# Patient Record
(demographics unavailable — no encounter records)

---

## 2024-10-10 NOTE — HISTORY OF PRESENT ILLNESS
[de-identified] : This is very nice 72-year-old female here for interim evaluation of bilateral total knee arthroplasty. The patient reports good pain relief since surgery in the replaced joint and satisfactory restoration of function in terms of activities of daily living. The patient no longer requires an assistive device for ambulation, does not require pain medication, and completed postoperative physical therapy. They report unlimited activities of daily living and unlimited walking tolerance. The patient is thrilled with their progress from surgery and ultimate outcome.

## 2024-10-10 NOTE — DISCUSSION/SUMMARY
[de-identified] :  The patient is doing well after bilateral total knee arthroplasty. Continue to be weight bearing as tolerated without restriction. Daily home exercise program recommended. Follow up is recommended in 1 year.

## 2024-10-10 NOTE — PHYSICAL EXAM
[de-identified] : Patient is well nourished, well-developed, in no acute distress, with appropriate mood and affect. The patient is oriented to time, place, and person. Respirations are even and unlabored. Gait evaluation does not reveal a limp. There is no inguinal adenopathy.  The bilateral limbs are well-perfused, has a well appearing surgical scar, and shows a grossly normal motor and sensory examination. Knee motion is painless and the bilateral knee moves from 0 to 125 degrees. The knee is stable within that range-of-motion to AP and ML stress. The alignment of the knee is neutral. Muscle strength is normal. Quadriceps and hamstring muscle strength is normal bilaterally. Pedal pulses are palpable.  [de-identified] : AP, lateral, tunnel, and sunrise knee x-rays of the bilateral knee were ordered and obtained in the office and demonstrate satisfactory position and alignment of the components are present. No signs of loosening are seen.

## 2024-10-29 NOTE — HISTORY OF PRESENT ILLNESS
[FreeTextEntry1] : 70 y/o G0 with LMP: 1989 presents for annual check as LEBRON daughter reported today.  CC: Right breast biopsy and radiation site for tubular cancer has started to open. No drainage.  OB: never pregnant  GYN: 1989 Total hysterectomy w/ Right oophorectomy for broad lesion fibroid.  Menstrual Triad: 11 x 30 x 5 Used Premarin for hot sweats in 40s post hyst. Used Premarin with T, then estrace patch.  No PMB nor changes to urination nor BM.   Had sebaceous cysts on perineum, but feeling better with AVAR wash.  Med: DM HLD hypo thyroid HTN Anxiety/depression - started during breast cancer tx.  LBBB constipation  Sx:  1989 hysterectomy - RSO 2000 Right breast lumpectomy- Stage 1 tubular breast cancer. In [place], had lumpectomy and LN dissection. Needed RT and Tamoxifen and Femara. 2010 Left breast reduction & abdominoplasty. 2011 salpingectomy, Left oophorectomy showed cystadenofibroma.  Rou-en-Y in past  SH: single, retired NP, lives alone, drinks alcohol occasionally     [Mammogramdate] : 3/22/24 [BoneDensityDate] : 3/22/24

## 2024-10-29 NOTE — DISCUSSION/SUMMARY
[FreeTextEntry1] : This year patient described herself as LEBRON daughter. Area is smooth without lesion. I have asked her to see Dr Paulo Dawn for complete evaluation.   Right breast with 3 x 2 cm calcified region read as " large area of calcified fat necrosis"  This area feels like there might be staples or other inorganic material in skin. 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 I have asked her to see breast surgeons for consultation.  Cont mammo/US   Vaginal dryness: estrogen used in past Pt asking for renewal. Use only 1/2 gm BIW now but stop prior to colposcopy. Maintenance of "pea-sized" amount to introitus BIW.  RTO 1 yr given LEBRON exposure.

## 2024-10-29 NOTE — PHYSICAL EXAM
[Appropriately responsive] : appropriately responsive [Alert] : alert [No Acute Distress] : no acute distress [No Lymphadenopathy] : no lymphadenopathy [Soft] : soft [Non-tender] : non-tender [Non-distended] : non-distended [No HSM] : No HSM [No Lesions] : no lesions [No Mass] : no mass [Oriented x3] : oriented x3 [FreeTextEntry6] : Right breast 4 cm scar in upper quadrant [Vulvar Atrophy] : vulvar atrophy [Labia Majora] : normal [Labia Minora] : normal [Normal] : normal [Absent] : absent [FreeTextEntry4] : Pink redness to apex c/w vaginal dryness. [FreeTextEntry5] : Recto-vaginal exam smooth, no lesions. [FreeTextEntry9] : Torres neg

## 2025-01-10 NOTE — DATA REVIEWED
[FreeTextEntry1] : I have independently reviewed the reports and the images.   B/l mammogram and US 3/22/24 - scattered fibroglandular density  - large calcified fat necrosis in R breast - BIRADS 2

## 2025-01-10 NOTE — PAST MEDICAL HISTORY
[Postmenopausal] : The patient is postmenopausal [Menarche Age ____] : age at menarche was [unfilled] [Menopause Age____] : age at menopause was [unfilled] [History of Hormone Replacement Treatment] : has a history of hormone replacement treatment [Total Preg ___] : G[unfilled] [de-identified] : systemic estradiol x 7 years, last taken around 1999

## 2025-01-10 NOTE — ASSESSMENT
[FreeTextEntry1] : Ms. Mayorga is a 73 year old woman 25 years s/p R lumpectomy, axillary node dissection. Her breast exam today is without suspicious findings. The imaging is reviewed with her. Based on the exam and imaging, the right breast lump is consistent with benign calcified fat necrosis, and with her general weight loss, has eroded through the skin of the breast and is painful. A referral to the Plastic Surgeon is provided regarding excision of this area given that it is tender and has eroded through the skin; the Plastic Surgeon can also be consulted regarding the significant breast asymmetry. She is to call us back after meeting with the Plastic Surgeon.   Given her early age of breast cancer diagnosis, we discussed genetic testing including the risks, benefits, and implications of the results from negative, to a variant of uncertain significance, to positive. A negative result does not exclude the possibility of a gene mutation that is at work but has not yet been discovered, and the interpretation of genetic testing results is to be done in conjunction with consideration of the family history. With a variant of uncertain significance, there is insufficient evidence to determine if the finding is benign or pathogenic; management is not changed based on variants of uncertain significance, and should new  to a change in the classification, the patient will be updated. With a positive gene mutation, management can change. Management options for carrying a mutation with an increased risk of breast cancer range from heightened surveillance with breast MRI to prophylactic mastectomies. Depending on the mutation, there may be an elevated risk for other types of cancer which would influence management accordingly. Emotional, family, insurance, and cost concerns are reviewed. Written information is provided. Ms. Mayorga is interested, and genetic testing is drawn.  A mammogram is ordered for March for annual screening. She understands and is comfortable with the plan. She is encouraged to call if any questions or concerns arise.

## 2025-01-10 NOTE — PAST MEDICAL HISTORY
[Postmenopausal] : The patient is postmenopausal [Menarche Age ____] : age at menarche was [unfilled] [Menopause Age____] : age at menopause was [unfilled] [History of Hormone Replacement Treatment] : has a history of hormone replacement treatment [Total Preg ___] : G[unfilled] [de-identified] : systemic estradiol x 7 years, last taken around 1999

## 2025-01-10 NOTE — PHYSICAL EXAM
[Normocephalic] : normocephalic [Atraumatic] : atraumatic [Sclera nonicteric] : sclera nonicteric [Supple] : supple [No Supraclavicular Adenopathy] : no supraclavicular adenopathy [No Cervical Adenopathy] : no cervical adenopathy [Clear to Auscultation Bilat] : clear to auscultation bilaterally [Normal Sinus Rhythm] : normal sinus rhythm [Examined in the supine and seated position] : examined in the supine and seated position [Asymmetrical] : asymmetrical [No dominant masses] : no dominant masses left breast [No Nipple Retraction] : no left nipple retraction [No Nipple Discharge] : no left nipple discharge [No Axillary Lymphadenopathy] : no left axillary lymphadenopathy [No Edema] : no edema [No Rashes] : no rashes [No Ulceration] : no ulceration [de-identified] : R << L [de-identified] : 5 cm hard mass by UOQ lumpectomy scar that is adherent to the skin, with a calcified point that has ulcerated through the skin

## 2025-01-10 NOTE — HISTORY OF PRESENT ILLNESS
[FreeTextEntry1] : Ms. Mayorga is a 73 year old woman who presents for a consultation for a right breast mass. Her breast history is significant for  1.) Right breast infiltrating ductal carcinoma diagnosed in 1999 at age 47, pathologic stage I, pT1 pN0, SBR 3 to 4/9, ER 80%, NM 90%, Her2 0 - s/p R lumpectomy, axillary node dissection (1/24/00, Dr. Day) and R re-excision lumpectomy (2/9/00) = no residual invasive tumor, DCIS, 0/15 LN - s/p radiation - s/p 7 yrs of tamoxifen then letrozole  Due to a chronic seroma and radiation, the patient had a chronic lump of calcified fat necrosis in the right upper breast by the lumpectomy scar. Over the past year, she has lost 40 pounds, and 6 months ago, a calcified point jutting out from the fat necrosis has eroded through the skin. The area is tender to pressure. She plans to be losing some more weight. No other lumps, nipple discharge or skin changes. No arm swelling.   Her family history is not significant for any breast cancer.

## 2025-01-10 NOTE — PHYSICAL EXAM
[Normocephalic] : normocephalic [Atraumatic] : atraumatic [Sclera nonicteric] : sclera nonicteric [Supple] : supple [No Supraclavicular Adenopathy] : no supraclavicular adenopathy [No Cervical Adenopathy] : no cervical adenopathy [Clear to Auscultation Bilat] : clear to auscultation bilaterally [Normal Sinus Rhythm] : normal sinus rhythm [Examined in the supine and seated position] : examined in the supine and seated position [Asymmetrical] : asymmetrical [No dominant masses] : no dominant masses left breast [No Nipple Retraction] : no left nipple retraction [No Nipple Discharge] : no left nipple discharge [No Axillary Lymphadenopathy] : no left axillary lymphadenopathy [No Edema] : no edema [No Rashes] : no rashes [No Ulceration] : no ulceration [de-identified] : R << L [de-identified] : 5 cm hard mass by UOQ lumpectomy scar that is adherent to the skin, with a calcified point that has ulcerated through the skin

## 2025-01-10 NOTE — CONSULT LETTER
[Dear  ___] : Dear  [unfilled], [Consult Letter:] : I had the pleasure of evaluating your patient, [unfilled]. [Please see my note below.] : Please see my note below. [Consult Closing:] : Thank you very much for allowing me to participate in the care of this patient.  If you have any questions, please do not hesitate to contact me. [Sincerely,] : Sincerely, [DrJhonathan  ___] : Dr. REED [FreeTextEntry3] : Marlena Atkinson MD FACS

## 2025-01-10 NOTE — HISTORY OF PRESENT ILLNESS
[FreeTextEntry1] : Ms. Mayorga is a 73 year old woman who presents for a consultation for a right breast mass. Her breast history is significant for  1.) Right breast infiltrating ductal carcinoma diagnosed in 1999 at age 47, pathologic stage I, pT1 pN0, SBR 3 to 4/9, ER 80%, ME 90%, Her2 0 - s/p R lumpectomy, axillary node dissection (1/24/00, Dr. Day) and R re-excision lumpectomy (2/9/00) = no residual invasive tumor, DCIS, 0/15 LN - s/p radiation - s/p 7 yrs of tamoxifen then letrozole  Due to a chronic seroma and radiation, the patient had a chronic lump of calcified fat necrosis in the right upper breast by the lumpectomy scar. Over the past year, she has lost 40 pounds, and 6 months ago, a calcified point jutting out from the fat necrosis has eroded through the skin. The area is tender to pressure. She plans to be losing some more weight. No other lumps, nipple discharge or skin changes. No arm swelling.   Her family history is not significant for any breast cancer.

## 2025-01-16 NOTE — PHYSICAL EXAM
[NI] : Normal [de-identified] : Please see scanned in breast sheet SN-N: L- 27 R- 24 N-IMF: L- 10, R- 8 BW: L/R: 14.5/15

## 2025-01-16 NOTE — ASSESSMENT
[FreeTextEntry1] : The patient was counseled about reconstructive options following mastectomy, including autologous, prosthetic, and the options of foregoing reconstruction.  Additionally, she was explained the options regarding the timing of reconstruction, including immediate reconstruction at the time of mastectomy or delayed reconstruction at a later date.   The patient was extensively counseled on the operation and the perioperative recoveries of both autologous and prosthetic reconstructions.  The patient understands the risks with abdominally based microsurgical reconstruction including partial or total flap loss, revisit to the operating room in the perioperative period, wound dehiscence, mastectomy skin flap necrosis, fat necrosis, abdominal wall morbidity (laxity, bulge, hernia), asymmetry, paresthesia, seroma, hematoma, and infection.  Placement of abdominal mesh is also planned and this was discussed with the patient. Risks of abdominal mesh placement include infection, extrusion, need for removal, mesh seroma. She also understands the risks with implant-based reconstruction including infection, implant malposition, extrusion, deflation, capsular contracture, mastectomy skin flap necrosis, wound dehiscence, asymmetry, seroma, paresthesia, and hematoma.   After careful consideration, the patient decided to proceed with right mastectomy with oncoplastic flat closure. The patient is in agreement with this plan and wishes to proceed.  The patient understands all of these risks and she provides informed consent.

## 2025-01-16 NOTE — HISTORY OF PRESENT ILLNESS
[FreeTextEntry1] : Ms. CHANTELLE FUENTES  is a 73 year  old female with past medical history of asthma, LBBB, hypothyroidism, aortic stenosis, T2DM, high cholesterol, and right breast infiltrating ductal carcinoma diagnosed in 1999 at age 47, who presents after being referred to the office by Dr Atkinson to discuss her revision options. Patient had a right lumpectomy, with axillary node dissection (1/24/00, Dr. Day) and right re-excision lumpectomy (2/9/00). In 2010, she had a revision surgery where they lifted and reduced her left breast for symmetry. She then underwent radiation therapy, followed by 7 years of tamoxifen and letrozole. She has a history of chronic seroma formation. She has developed fat necrosis adjacent to her lumpectomy scar. A calcified region of the fat necrosis eroded through her skin and is now visible. This area has become very painful and irritating for the patient. She wishes for this to be evaluated and to discuss surgical options.    smoking status: none/never anticoagulation: 81 mg ASA history of bleeding disorder: none family history of breast cancer: none Currently takes Mounjaro  Cardiologist: Dr. Jakob Meza Pulmonologist: Dr. Saravia

## 2025-01-16 NOTE — REASON FOR VISIT
[Consultation] : a consultation visit [FreeTextEntry1] : former breast cancer, referred by Dr Atkinson

## 2025-01-28 NOTE — HISTORY OF PRESENT ILLNESS
[FreeTextEntry1] : Spoke to patient with genetic testing results.  Blood sample was used to evaluate 76  genes for variants associated with genetic disorders.  The test did not identify any pathogenic variants known to cause disease.  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  Patient is advised that in a small number of cases, future research may determine that a variant of uncertain significance increases the risk of a disease however, in most cases these variants are found to be unrelated to disease risk.  She is advised that more information on VUS could be available in the future and if the classifications change an amended report would be issued to us. She was encouraged to maintain updated contact information with our office and the laboratory. She may also contact the lab proactively for updates to any VUS classifications.     She is further advised that an individuals cancer risk are not determined by genetic test results alone and that her risk for cancer could still be influenced by a combination of unidentified genetic, personal and /or environmental risk factors. Any questions/concerns regarding the test results were addressed with patient.  Results have been reviewed by attending surgeon and full report is on chart.

## 2025-02-04 NOTE — HISTORY OF PRESENT ILLNESS
[FreeTextEntry1] : Pt to have right mastectomy with Dr Atkinson  for   mastectomy  for probabale fat necrossis.  Has  had cardiac clearance with Dr Meza  will be getting labs on 2/4  and   surgery  2/1/2/25   Dr Atkinson and Dr Dey at [facility]

## 2025-02-04 NOTE — ASSESSMENT
[FreeTextEntry1] : All preventative measures were reviewed with the patient and the patient is due for and agrees to the following as outlined  in the plan  below.  Lab were reviewed in detail with the patient.

## 2025-02-04 NOTE — PHYSICAL EXAM
[No Acute Distress] : no acute distress [Well Nourished] : well nourished [Well Developed] : well developed [Well-Appearing] : well-appearing [Normal Sclera/Conjunctiva] : normal sclera/conjunctiva [PERRL] : pupils equal round and reactive to light [EOMI] : extraocular movements intact [Normal Outer Ear/Nose] : the outer ears and nose were normal in appearance [Normal Oropharynx] : the oropharynx was normal [No JVD] : no jugular venous distention [No Lymphadenopathy] : no lymphadenopathy [Supple] : supple [Thyroid Normal, No Nodules] : the thyroid was normal and there were no nodules present [No Respiratory Distress] : no respiratory distress  [No Accessory Muscle Use] : no accessory muscle use [Clear to Auscultation] : lungs were clear to auscultation bilaterally [Normal Rate] : normal rate  [Regular Rhythm] : with a regular rhythm [Normal S1, S2] : normal S1 and S2 [No Murmur] : no murmur heard [No Carotid Bruits] : no carotid bruits [No Abdominal Bruit] : a ~M bruit was not heard ~T in the abdomen [No Varicosities] : no varicosities [Pedal Pulses Present] : the pedal pulses are present [No Edema] : there was no peripheral edema [No Palpable Aorta] : no palpable aorta [No Extremity Clubbing/Cyanosis] : no extremity clubbing/cyanosis [Soft] : abdomen soft [Non Tender] : non-tender [Non-distended] : non-distended [No Masses] : no abdominal mass palpated [No HSM] : no HSM [Normal Bowel Sounds] : normal bowel sounds [Normal Posterior Cervical Nodes] : no posterior cervical lymphadenopathy [Normal Anterior Cervical Nodes] : no anterior cervical lymphadenopathy [No CVA Tenderness] : no CVA  tenderness [No Spinal Tenderness] : no spinal tenderness [No Joint Swelling] : no joint swelling [Grossly Normal Strength/Tone] : grossly normal strength/tone [No Rash] : no rash [Coordination Grossly Intact] : coordination grossly intact [No Focal Deficits] : no focal deficits [Normal Gait] : normal gait [Deep Tendon Reflexes (DTR)] : deep tendon reflexes were 2+ and symmetric [Normal Affect] : the affect was normal [Normal Insight/Judgement] : insight and judgment were intact [de-identified] : right breast mass [FreeTextEntry1] : deferred to gyn

## 2025-02-04 NOTE — HISTORY OF PRESENT ILLNESS
Annual exam ages 69+    [address] is a ,  79 y.o. female   No LMP recorded. Patient is postmenopausal.    She presents for her annual checkup. She is having no significant problems. Pt wants to discuss recent dexa results. Menstrual status:    Her periods are absent due to menopause. Contraception:    The current method of family planning is NA post menopause. Hormonal status:    She is not having vasomotor symptoms. The patient is not using any ERT. Sexual history:    She  reports that she is not currently sexually active and has had partner(s) who are male. She reports using the following method of birth control/protection: None. Medical conditions:    Since her last annual GYN exam about two years ago, she has not the following changes in her health history: none. Pap and Mammogram History:    Her most recent Pap smear was normal obtained 2 year(s) ago. The patient had a recent mammogram on 1/3/2022 which was negative for malignancy. Breast Cancer History/Substance Abuse: negative    Osteoporosis History:    Family history does not include a first or second degree relative with osteopenia or osteoporosis. A bone density scan was obtained 2022 and revealed advance osteopenia. She is currently taking calcium and vit D. Past Medical History:   Diagnosis Date    Back problem     Breast cancer ([address] 2020    right    Cervical stenosis (uterine cervix)     Fibrocystic breast     GERD (gastroesophageal reflux disease)     Hx of bone density study 2020,2017    2020: BMD stable--some osteopenia.   Hip is same, spine is [id number]:osteopenia -spine (-1.0)   hip (-1.4);:Osteopenia of the hip is worse with normal spine    Hx of colonoscopy     IBS (irritable bowel syndrome)     Menopausal syndrome     Osteopenia 2020    Routine Papanicolaou smear 17 neg HPV neg     Past Surgical History:   Procedure Laterality Date    HX HYSTEROSCOPY WITH ENDOMETRIAL ABLATION  09/20/12    w/ D&C--in office--path WNL    HX LAP CHOLECYSTECTOMY      HX MASTECTOMY Right 01/2020    HX MYOMECTOMY  1998    HX OTHER SURGICAL  06/2001    [facility]    HX TUBAL LIGATION         Current Outpatient Medications   Medication Sig Dispense Refill    alendronate (FOSAMAX) 5 mg tablet Take 70 mg by mouth Daily (before breakfast). anastrozole (ARIMIDEX) 1 mg tablet       acetaminophen (TYLENOL) 500 mg tablet Take  by mouth every six (6) hours as needed for Pain.      meloxicam (MOBIC) 7.5 mg tablet       calcium-cholecalciferol, d3, 600-125 mg-unit tab Take  by mouth.      esomeprazole (NEXIUM) 40 mg capsule Take  by mouth daily. multivitamin (ONE A DAY) tablet Take 1 Tab by mouth daily. cyclobenzaprine (FLEXERIL) 10 mg tablet Take 1 Tab by mouth three (3) times daily as needed for Muscle Spasm(s). (Patient not taking: Reported on 10/12/2022) 20 Tab 0    montelukast (SINGULAIR) 10 mg tablet  (Patient not taking: Reported on 10/12/2022)      naproxen sodium (NAPROSYN) 220 mg tablet Take 220 mg by mouth two (2) times daily (with meals). (Patient not taking: Reported on 10/12/2022)      ibuprofen 200 mg cap Take 600 mg by mouth three (3) times daily. (Patient not taking: Reported on 10/12/2022)      DOCOSAHEXANOIC ACID/EPA (FISH OIL PO) Take  by mouth. (Patient not taking: Reported on 10/12/2022)       Allergies: Sulfa (sulfonamide antibiotics)     Tobacco History:  reports that she has never smoked. She has never used smokeless tobacco.  Alcohol Abuse:  reports current alcohol use of about 6.7 standard drinks per week. Drug Abuse:  reports no history of drug use.     Family Medical/Cancer History:   Family History   Problem Relation Age of Onset    Stroke Father 62    Cancer Father         lung cancer    Diabetes Father     Heart Attack Mother 76    Heart Disease Mother     Cancer Brother     Breast Cancer Other         Aunt & cousin    Cancer Other         breast cancer    Cancer Maternal Aunt         breast cancer, 80's        Review of Systems - History obtained from the patient  Constitutional: negative for weight loss, fever, night sweats  HEENT: negative for hearing loss, earache, congestion, snoring, sorethroat  CV: negative for chest pain, palpitations, edema  Resp: negative for cough, shortness of breath, wheezing  GI: negative for change in bowel habits, abdominal pain, black or bloody stools  : negative for frequency, dysuria, hematuria, vaginal discharge  MSK: negative for back pain, joint pain, muscle pain  Breast: negative for breast lumps, nipple discharge, galactorrhea  Skin :negative for itching, rash, hives  Neuro: negative for dizziness, headache, confusion, weakness  Psych: negative for anxiety, depression, change in mood  Heme/lymph: negative for bleeding, bruising, pallor    Physical Exam    Visit Vitals  BP (!) 145/75   Wt 131 lb 3.2 oz (59.5 kg)   BMI 22.52 kg/m²       Constitutional  Appearance: well-nourished, well developed, alert, in no acute distress    HENT  Head and Face: appears normal    Neck  Inspection/Palpation: normal appearance, no masses or tenderness  Lymph Nodes: no lymphadenopathy present  Thyroid: gland size normal, nontender, no nodules or masses present on palpation    Chest  Respiratory Effort: breathing unlabored  Auscultation: normal breath sounds    Cardiovascular  Heart:   Auscultation: regular rate and rhythm without murmur    Breasts  Deferred to onc    Gastrointestinal  Abdominal Examination: abdomen non-tender to palpation, normal bowel sounds, no masses present  Liver and spleen: no hepatomegaly present, spleen not palpable  Hernias: no hernias identified    Genitourinary  External Genitalia: normal appearance for age, no discharge present, no tenderness present, no inflammatory lesions present, no masses present, atrophy present  Vagina: atrophic but otherwise normal vaginal vault without central or paravaginal defects, no discharge present, no inflammatory lesions present, no masses present  Bladder: non-tender to palpation  Urethra: appears normal  Cervix: normal   Uterus: normal size, shape and consistency  Adnexa: no adnexal tenderness present, no adnexal masses present  Perineum: perineum within normal limits, no evidence of trauma, no rashes or skin lesions present  Anus: anus within normal limits, no hemorrhoids present  Inguinal Lymph Nodes: no lymphadenopathy present    Skin  General Inspection: no rash, no lesions identified    Neurologic/Psychiatric  Mental Status:  Orientation: grossly oriented to person, place and time  Mood and Affect: mood normal, affect appropriate    Assessment:  Routine gynecologic examination  Her current medical status is satisfactory with no evidence of significant gynecologic issues except atrophy. Deferred osteopenia management to primary/endo.     Plan:  Counseled re: diet, exercise, healthy lifestyle  Return for yearly wellness visits  Rec annual mammogram [FreeTextEntry1] : Pt to have right mastectomy with Dr Atkinson  for   mastectomy  for probabale fat necrossis.  Has  had cardiac clearance with Dr Meza  will be getting labs on 2/4  and   surgery  2/1/2/25   Dr Atkinson and Dr Dey at [facility]

## 2025-02-04 NOTE — HEALTH RISK ASSESSMENT
[Yes] : Yes [Monthly or less (1 pt)] : Monthly or less (1 point) [1 or 2 (0 pts)] : 1 or 2 (0 points) [Never (0 pts)] : Never (0 points) [No] : In the past 12 months have you used drugs other than those required for medical reasons? No [0] : 2) Feeling down, depressed, or hopeless: Not at all (0) [Never] : Never [No falls in past year] : Patient reported no falls in the past year [PHQ-2 Negative - No further assessment needed] : PHQ-2 Negative - No further assessment needed [NO] : No [None] : None [] :  [Fully functional (bathing, dressing, toileting, transferring, walking, feeding)] : Fully functional (bathing, dressing, toileting, transferring, walking, feeding) [Fully functional (using the telephone, shopping, preparing meals, housekeeping, doing laundry, using] : Fully functional and needs no help or supervision to perform IADLs (using the telephone, shopping, preparing meals, housekeeping, doing laundry, using transportation, managing medications and managing finances) [Smoke Detector] : smoke detector [Carbon Monoxide Detector] : carbon monoxide detector [Seat Belt] :  uses seat belt [Sunscreen] : uses sunscreen [With Patient/Caregiver] : , with patient/caregiver [de-identified] : walking [de-identified] : reg [GEN2Qwwrn] : 0 [EyeExamDate] : 10/24 [Change in mental status noted] : No change in mental status noted [Reports changes in hearing] : Reports no changes in hearing [MammogramDate] : 01/29/25 [BoneDensityDate] : 03/24 [ColonoscopyDate] : 01/2020 [AdvancecareDate] : 1/30/25

## 2025-02-04 NOTE — HEALTH RISK ASSESSMENT
[Yes] : Yes [Monthly or less (1 pt)] : Monthly or less (1 point) [1 or 2 (0 pts)] : 1 or 2 (0 points) [Never (0 pts)] : Never (0 points) [No] : In the past 12 months have you used drugs other than those required for medical reasons? No [0] : 2) Feeling down, depressed, or hopeless: Not at all (0) [Never] : Never [No falls in past year] : Patient reported no falls in the past year [PHQ-2 Negative - No further assessment needed] : PHQ-2 Negative - No further assessment needed [NO] : No [None] : None [] :  [Fully functional (bathing, dressing, toileting, transferring, walking, feeding)] : Fully functional (bathing, dressing, toileting, transferring, walking, feeding) [Fully functional (using the telephone, shopping, preparing meals, housekeeping, doing laundry, using] : Fully functional and needs no help or supervision to perform IADLs (using the telephone, shopping, preparing meals, housekeeping, doing laundry, using transportation, managing medications and managing finances) [Smoke Detector] : smoke detector [Carbon Monoxide Detector] : carbon monoxide detector [Seat Belt] :  uses seat belt [Sunscreen] : uses sunscreen [With Patient/Caregiver] : , with patient/caregiver [de-identified] : walking [de-identified] : reg [RLN9Rptrc] : 0 [EyeExamDate] : 10/24 [Change in mental status noted] : No change in mental status noted [Reports changes in hearing] : Reports no changes in hearing [MammogramDate] : 01/29/25 [BoneDensityDate] : 03/24 [ColonoscopyDate] : 01/2020 [AdvancecareDate] : 1/30/25

## 2025-02-04 NOTE — ADDENDUM
[FreeTextEntry1] : appreciate pulmonary input.  Labs reviewed The preoperative labs and EKG are unremarkable. There is NO MEDICAL CONTRAINDICATION to the procedure.

## 2025-02-04 NOTE — PHYSICAL EXAM
[No Acute Distress] : no acute distress [Well Nourished] : well nourished [Well Developed] : well developed [Well-Appearing] : well-appearing [Normal Sclera/Conjunctiva] : normal sclera/conjunctiva [PERRL] : pupils equal round and reactive to light [EOMI] : extraocular movements intact [Normal Outer Ear/Nose] : the outer ears and nose were normal in appearance [Normal Oropharynx] : the oropharynx was normal [No JVD] : no jugular venous distention [No Lymphadenopathy] : no lymphadenopathy [Supple] : supple [Thyroid Normal, No Nodules] : the thyroid was normal and there were no nodules present [No Respiratory Distress] : no respiratory distress  [No Accessory Muscle Use] : no accessory muscle use [Clear to Auscultation] : lungs were clear to auscultation bilaterally [Normal Rate] : normal rate  [Regular Rhythm] : with a regular rhythm [Normal S1, S2] : normal S1 and S2 [No Murmur] : no murmur heard [No Carotid Bruits] : no carotid bruits [No Abdominal Bruit] : a ~M bruit was not heard ~T in the abdomen [No Varicosities] : no varicosities [Pedal Pulses Present] : the pedal pulses are present [No Edema] : there was no peripheral edema [No Palpable Aorta] : no palpable aorta [No Extremity Clubbing/Cyanosis] : no extremity clubbing/cyanosis [Soft] : abdomen soft [Non Tender] : non-tender [Non-distended] : non-distended [No Masses] : no abdominal mass palpated [No HSM] : no HSM [Normal Bowel Sounds] : normal bowel sounds [Normal Posterior Cervical Nodes] : no posterior cervical lymphadenopathy [Normal Anterior Cervical Nodes] : no anterior cervical lymphadenopathy [No CVA Tenderness] : no CVA  tenderness [No Spinal Tenderness] : no spinal tenderness [No Joint Swelling] : no joint swelling [Grossly Normal Strength/Tone] : grossly normal strength/tone [No Rash] : no rash [Coordination Grossly Intact] : coordination grossly intact [No Focal Deficits] : no focal deficits [Normal Gait] : normal gait [Deep Tendon Reflexes (DTR)] : deep tendon reflexes were 2+ and symmetric [Normal Affect] : the affect was normal [Normal Insight/Judgement] : insight and judgment were intact [de-identified] : right breast mass [FreeTextEntry1] : deferred to gyn

## 2025-02-18 NOTE — PHYSICAL EXAM
[NI] : Normal [de-identified] : Right chest wall flat  no signs of infection or palpable fluid collections noted right drain in place and functioning, drain sites without erythema, drainage serosanguinous  incisions c/d/i breast skin flaps with normal capillary refill and warm surgical tape and glue intact minimal swelling bruising to medial right breast [de-identified] : bruising to right lower quadrant

## 2025-02-18 NOTE — HISTORY OF PRESENT ILLNESS
[FreeTextEntry1] : Ms. CHANTELLE FUENTES  is a 73 year  old female with past medical history of asthma, LBBB, hypothyroidism, aortic stenosis, T2DM, high cholesterol, and right breast infiltrating ductal carcinoma diagnosed in 1999 at age 47, who presents after being referred to the office by Dr Atkinson to discuss her revision options. Patient had a right lumpectomy, with axillary node dissection (1/24/00, Dr. Day) and right re-excision lumpectomy (2/9/00). In 2010, she had a revision surgery where they lifted and reduced her left breast for symmetry. She then underwent radiation therapy, followed by 7 years of tamoxifen and letrozole. She has a history of chronic seroma formation. She has developed fat necrosis adjacent to her lumpectomy scar. A calcified region of the fat necrosis eroded through her skin and is now visible. T  She is now s/p right mastectomy with oncoplastic flat closure 2/12 Doing well Drain: < 20

## 2025-02-18 NOTE — ASSESSMENT
[FreeTextEntry1] : Patient is doing well and healing as expected Restrictions discussed with patient today. Restrictions include limiting upper extremity stretching or movements, no heavy lifting (> 10 pounds), no side sleeping for 3-4 weeks, no strenuous activities or exercise, no swimming Patient may shower Walking strongly encouraged Drain removed today without difficulties. Site was covered with gauze and tegaderm. Patient may shower normally and can remove waterproof bandage in 2 days. After 2 days, the site may be covered with a small amount of bacitracin and bandaid for an additional couple days after to ensure healing. monitor for redness, swelling, fever, chills no heavy lifting or strenuous activity continue to wear soft, non wire bra she is encouraged to call if there are any changes RTO in 2 weeks all pt questions answered

## 2025-02-18 NOTE — REASON FOR VISIT
[Follow-Up: _____] : a [unfilled] follow-up visit [FreeTextEntry1] : s/p right mastectomy with oncoplastic flat closure 2/12

## 2025-02-26 NOTE — PHYSICAL EXAM
[Normocephalic] : normocephalic [Atraumatic] : atraumatic [Sclera nonicteric] : sclera nonicteric [Examined in the supine and seated position] : examined in the supine and seated position [Asymmetrical] : asymmetrical [No dominant masses] : no dominant masses left breast [No Nipple Retraction] : no left nipple retraction [No Nipple Discharge] : no left nipple discharge [No Axillary Lymphadenopathy] : no left axillary lymphadenopathy [No Edema] : no edema [No Rashes] : no rashes [No Ulceration] : no ulceration [No dominant masses] : no dominant masses in right breast  [de-identified] : R << L [de-identified] : Transverse incision intact. Ecchymosis in inferior to lateral aspects [de-identified] : Ecchymosis in lateral aspect [de-identified] : ecchymosis by lateral abdomen bilaterally [de-identified] : ecchymosis and hematoma by left arm

## 2025-02-26 NOTE — HISTORY OF PRESENT ILLNESS
[FreeTextEntry1] : Ms. Mayorga is a 73 year old woman who presents for a postop visit s/p R mastectomy. Her breast history is significant for  1.) Right breast infiltrating ductal carcinoma diagnosed in 1999 at age 47, pathologic stage I, pT1 pN0, SBR 3 to 4/9, ER 80%, [place] 90%, Her2 0 - s/p R lumpectomy, axillary node dissection (1/24/00, Dr. Day) and R re-excision lumpectomy (2/9/00) = no residual invasive tumor, DCIS, 0/15 LN - s/p radiation - s/p 7 yrs of tamoxifen then letrozole  Due to a chronic seroma and radiation, the patient had a chronic lump of calcified fat necrosis in the right upper breast by the lumpectomy scar. Over the past year, she has lost 40 pounds, and 6 months ago, a calcified point jutting out from the fat necrosis has eroded through the skin. The area is tender. Postoperatively she had much bruising over the bilateral lateral breast and abdominal regions and left arm regions; the bruising is now subsiding and lightening up. She was using both Tylenol and ibuprofen 400-600 mg for pain control.   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 Her family history is not significant for any breast cancer.

## 2025-02-26 NOTE — PAST MEDICAL HISTORY
[Postmenopausal] : The patient is postmenopausal [Menarche Age ____] : age at menarche was [unfilled] [Menopause Age____] : age at menopause was [unfilled] [History of Hormone Replacement Treatment] : has a history of hormone replacement treatment [Total Preg ___] : G[unfilled] [de-identified] : systemic estradiol x 7 years, last taken around 1999

## 2025-02-26 NOTE — ASSESSMENT
[FreeTextEntry1] : Ms. Mayorga is a 73 year old woman 25 years s/p R lumpectomy, axillary node dissection who is now s/p R mastectomy for a painful right breast mass. Given the extensive ecchymosis, she is recommended to stop the ibuprofen and use just Tylenol for pain control. Surgical pathology is pending.   2/25/26 - I spoke with Ms. Mayorga. Although her imaging had shown dense calcified fat necrosis and was benign, her surgical pathology showed a 2.8 cm infiltrating ductal carcinoma within that calcified mass. A PET scan and breast MRI are ordered. I will see her for a follow-up for further discussion.

## 2025-02-26 NOTE — PHYSICAL EXAM
[Normocephalic] : normocephalic [Atraumatic] : atraumatic [Sclera nonicteric] : sclera nonicteric [Examined in the supine and seated position] : examined in the supine and seated position [Asymmetrical] : asymmetrical [No dominant masses] : no dominant masses left breast [No Nipple Retraction] : no left nipple retraction [No Nipple Discharge] : no left nipple discharge [No Axillary Lymphadenopathy] : no left axillary lymphadenopathy [No Edema] : no edema [No Rashes] : no rashes [No Ulceration] : no ulceration [No dominant masses] : no dominant masses in right breast  [de-identified] : R << L [de-identified] : Transverse incision intact. Ecchymosis in inferior to lateral aspects [de-identified] : Ecchymosis in lateral aspect [de-identified] : ecchymosis by lateral abdomen bilaterally [de-identified] : ecchymosis and hematoma by left arm

## 2025-02-26 NOTE — CONSULT LETTER
[Dear  ___] : Dear  [unfilled], [Courtesy Letter:] : I had the pleasure of seeing your patient, [unfilled], in my office today. [Please see my note below.] : Please see my note below. [Consult Closing:] : Thank you very much for allowing me to participate in the care of this patient.  If you have any questions, please do not hesitate to contact me. [Sincerely,] : Sincerely, [FreeTextEntry3] : Marlena Atkinson MD FACS

## 2025-02-26 NOTE — HISTORY OF PRESENT ILLNESS
[FreeTextEntry1] : Ms. Mayorga is a 73 year old woman who presents for a postop visit s/p R mastectomy. Her breast history is significant for  1.) Right breast infiltrating ductal carcinoma diagnosed in 1999 at age 47, pathologic stage I, pT1 pN0, SBR 3 to 4/9, ER 80%, [place] 90%, Her2 0 - s/p R lumpectomy, axillary node dissection (1/24/00, Dr. Day) and R re-excision lumpectomy (2/9/00) = no residual invasive tumor, DCIS, 0/15 LN - s/p radiation - s/p 7 yrs of tamoxifen then letrozole  Due to a chronic seroma and radiation, the patient had a chronic lump of calcified fat necrosis in the right upper breast by the lumpectomy scar. Over the past year, she has lost 40 pounds, and 6 months ago, a calcified point jutting out from the fat necrosis has eroded through the skin. The area is tender. Postoperatively she had much bruising over the bilateral lateral breast and abdominal regions and left arm regions; the bruising is now subsiding and lightening up. She was using both Tylenol and ibuprofen 400-600 mg for pain control.   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 Her family history is not significant for any breast cancer.

## 2025-02-26 NOTE — DATA REVIEWED
[FreeTextEntry1] : I have independently reviewed the reports and the images.   B/l mammogram and US 3/22/24 - scattered fibroglandular density  - large calcified fat necrosis in R breast - BIRADS 2    B/l mammogram 1/29/25 - scattered fibroglandular density  - dense calcified fat necrosis in R lumpectomy stie - BIRADS 2

## 2025-02-26 NOTE — PAST MEDICAL HISTORY
[Postmenopausal] : The patient is postmenopausal [Menarche Age ____] : age at menarche was [unfilled] [Menopause Age____] : age at menopause was [unfilled] [History of Hormone Replacement Treatment] : has a history of hormone replacement treatment [Total Preg ___] : G[unfilled] [de-identified] : systemic estradiol x 7 years, last taken around 1999

## 2025-03-04 NOTE — PHYSICAL EXAM
[NI] : Normal [de-identified] : Right chest wall flat  no signs of infection or palpable fluid collections noted incisions c/d/i breast skin flaps with normal capillary refill and warm surgical tape and glue intact minimal swelling bruising to medial right breast [de-identified] : bruising to right lower quadrant

## 2025-03-04 NOTE — PHYSICAL EXAM
[NI] : Normal [de-identified] : Right chest wall flat  no signs of infection or palpable fluid collections noted incisions c/d/i breast skin flaps with normal capillary refill and warm surgical tape and glue intact minimal swelling bruising to medial right breast [de-identified] : bruising to right lower quadrant

## 2025-03-04 NOTE — HISTORY OF PRESENT ILLNESS
[FreeTextEntry1] : Ms. CHANTELLE FUENTES  is a 73 year  old female with past medical history of asthma, LBBB, hypothyroidism, aortic stenosis, T2DM, high cholesterol, and right breast infiltrating ductal carcinoma diagnosed in 1999 at age 47, who presents after being referred to the office by Dr Atkinson to discuss her revision options. Patient had a right lumpectomy, with axillary node dissection (1/24/00, Dr. Day) and right re-excision lumpectomy (2/9/00). In 2010, she had a revision surgery where they lifted and reduced her left breast for symmetry. She then underwent radiation therapy, followed by 7 years of tamoxifen and letrozole. She has a history of chronic seroma formation. She has developed fat necrosis adjacent to her lumpectomy scar. A calcified region of the fat necrosis eroded through her skin and is now visible. T  She is now s/p right mastectomy with oncoplastic flat closure 2/12 Doing ok Spoke with breast surgery regarding pathology results. She is going for PET scan on Thursday

## 2025-03-04 NOTE — ASSESSMENT
[FreeTextEntry1] : Patient is doing well and healing as expected Light massage to medial right breast Restrictions discussed with patient today. Restrictions include limiting upper extremity stretching or movements, no heavy lifting (> 10 pounds), no side sleeping for 3-4 weeks, no strenuous activities or exercise, no swimming Patient may shower Walking strongly encouraged monitor for redness, swelling, fever, chills no heavy lifting or strenuous activity continue to wear soft, non wire bra she is encouraged to call if there are any changes RTO in 2 weeks all pt questions answered

## 2025-03-17 NOTE — PROCEDURE
[FreeTextEntry1] : US-guided FNA of right breast fluid collection [FreeTextEntry2] : Right mastectomy seroma [FreeTextEntry3] : The procedure was reviewed, and consent was obtained. The patient was placed in a left lateral decubitus position. The right lateral breast was prepped with an alcohol swab, and 1 ml of 1% lidocaine was injected for local anesthesia. Under ultrasound guidance, a 22G needle was advanced in the lateral to medial direction into the seroma and 22 ml of serosanguinous fluid was aspirated with resolution of the seroma on US and on exam. Pressure was applied for hemostasis, and the skin site was dressed with a Band-Aid. The patient tolerated the procedure well.

## 2025-03-17 NOTE — PAST MEDICAL HISTORY
[Postmenopausal] : The patient is postmenopausal [Menarche Age ____] : age at menarche was [unfilled] [Menopause Age____] : age at menopause was [unfilled] [History of Hormone Replacement Treatment] : has a history of hormone replacement treatment [Total Preg ___] : G[unfilled] [de-identified] : systemic estradiol x 7 years, last taken around 1999

## 2025-03-17 NOTE — ASSESSMENT
[FreeTextEntry1] : Ms. Mayorga is a 73 year old woman  - 25 years s/p R lumpectomy, axillary node dissection for a stage right infiltrating ductal carcinoma  - now with a local recurrence of right infiltrating ductal carcinoma s/p R mastectomy  She is recovering well. An US-guided FNA of right mastectomy flap seroma was performed to help with healing. Her MRI is reviewed and did not show any lymphadenopathy; a 6 month MRI is recommended for follow-up of the enhancement that is likely post-surgical changes. I will call her once the PET scan report is back.  With the local recurrence of the right breast cancer, a mastectomy has already been performed. The margins are negative. Her case has been discussed at our tumor board. As she has previously had a right axillary node dissection, and the right axilla is negative on exam and on MRI, a sentinel node biopsy is not needed. An Oncotype has been sent, and she will be meeting with Dr. King in a week. A referral to the Radiation Oncologist is provided regarding possible radiation treatment. I would like to see her back for a follow-up in 4 months. She understands and is comfortable with the plan. She is encouraged to call if any questions or concerns arise.

## 2025-03-17 NOTE — PAST MEDICAL HISTORY
[Postmenopausal] : The patient is postmenopausal [Menarche Age ____] : age at menarche was [unfilled] [Menopause Age____] : age at menopause was [unfilled] [History of Hormone Replacement Treatment] : has a history of hormone replacement treatment [Total Preg ___] : G[unfilled] [de-identified] : systemic estradiol x 7 years, last taken around 1999

## 2025-03-17 NOTE — DATA REVIEWED
[FreeTextEntry1] : I have independently reviewed the reports and the images.   B/l mammogram and US 3/22/24 - scattered fibroglandular density  - large calcified fat necrosis in R breast - BIRADS 2    B/l mammogram 1/29/25 - scattered fibroglandular density  - dense calcified fat necrosis in R lumpectomy site - BIRADS 2   Surgical pathology 2/12/25 - R mastectomy = 2.8 cm infiltrating ductal carcinoma, ER 90%, [place] 0, Her2 0  Breast MRI 3/2/25 - post-surgical change in L breast; 6 mo MRI - no lymphadenopathy -  BIRADS 3

## 2025-03-17 NOTE — PHYSICAL EXAM
[Normocephalic] : normocephalic [Atraumatic] : atraumatic [Sclera nonicteric] : sclera nonicteric [Examined in the supine and seated position] : examined in the supine and seated position [Asymmetrical] : asymmetrical [No dominant masses] : no dominant masses in right breast  [No dominant masses] : no dominant masses left breast [No Nipple Retraction] : no left nipple retraction [No Nipple Discharge] : no left nipple discharge [No Axillary Lymphadenopathy] : no left axillary lymphadenopathy [No Edema] : no edema [No Rashes] : no rashes [No Ulceration] : no ulceration [de-identified] : R << L [de-identified] : Transverse incision intact. Prior ecchymosis resolved. Swelling from seroma in lateral aspect of incision

## 2025-03-17 NOTE — HISTORY OF PRESENT ILLNESS
[FreeTextEntry1] : Ms. Mayorga is a 73 year old woman who presents for a postop visit s/p R mastectomy. Her breast history is significant for  1.) Right breast infiltrating ductal carcinoma diagnosed in 1999 at age 47, pathologic stage I, pT1 pN0, SBR 3 to 4/9, ER 80%, [place] 90%, Her2 0 - s/p R lumpectomy, axillary node dissection (1/24/00, Dr. Day) and R re-excision lumpectomy (2/9/00) = no residual invasive tumor, DCIS, 0/15 LN - s/p radiation - s/p 7 yrs of tamoxifen then letrozole  2.) Local recurrence of right infiltrating ductal carcinoma, occult on imaging and diagnosed after mastectomy  She is doing well. The bruising has subsided.  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 Her family history is not significant for any breast cancer.

## 2025-03-17 NOTE — PHYSICAL EXAM
[Normocephalic] : normocephalic [Atraumatic] : atraumatic [Sclera nonicteric] : sclera nonicteric [Examined in the supine and seated position] : examined in the supine and seated position [Asymmetrical] : asymmetrical [No dominant masses] : no dominant masses in right breast  [No dominant masses] : no dominant masses left breast [No Nipple Retraction] : no left nipple retraction [No Nipple Discharge] : no left nipple discharge [No Axillary Lymphadenopathy] : no left axillary lymphadenopathy [No Edema] : no edema [No Rashes] : no rashes [No Ulceration] : no ulceration [de-identified] : R << L [de-identified] : Transverse incision intact. Prior ecchymosis resolved. Swelling from seroma in lateral aspect of incision

## 2025-03-17 NOTE — HISTORY OF PRESENT ILLNESS
[FreeTextEntry1] : Ms. Mayorga is a 73 year old woman who presents for a postop visit s/p R mastectomy. Her breast history is significant for  1.) Right breast infiltrating ductal carcinoma diagnosed in 1999 at age 47, pathologic stage I, pT1 pN0, SBR 3 to 4/9, ER 80%, VA 90%, Her2 0 - s/p R lumpectomy, axillary node dissection (1/24/00, Dr. Day) and R re-excision lumpectomy (2/9/00) = no residual invasive tumor, DCIS, 0/15 LN - s/p radiation - s/p 7 yrs of tamoxifen then letrozole  2.) Local recurrence of right infiltrating ductal carcinoma, occult on imaging and diagnosed after mastectomy  She is doing well. The bruising has subsided.  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 Her family history is not significant for any breast cancer.

## 2025-03-17 NOTE — DATA REVIEWED
Seble is a 47 year old who is being evaluated via a billable video visit.      How would you like to obtain your AVS? MyChart  If the video visit is dropped, the invitation should be resent by: Text to cell phone: [phone]   Will anyone else be joining your video visit? No                PM[facility]R Clinic Note     Patient Name: Seble Lopez : 1974 Medical Record: [id number]     Requesting Physician/clinician: Ibrahima Amador, *           History of Present Illness:     Seble Lopez is a 47 year old female that per records and reporting patient she states about 6 months ago.  Got first vaccine, than that day, felt off but child was sick as well.  Son was positive and she was as well.  Than whole family tested positive.  3-4 weeks was very sick.  Missed some work.  She gets bout of very fatigue and hard to get through work day, than exhausted.   Barely able to do things.   Sometimes feels chest ache, breathing issues, or shallow.  Sometimes a pain as well.   She does get occasional headache as well.  Does get occasional hotness, drenched in sweat.  Has put on ton of weight.  Not exercising as much as precovid.              Past Medical and Surgical History:     Past Medical History:   Diagnosis Date     Allergic rhinitis, cause unspecified     takes prn claritin     No past surgical history on file.         Social History:     Social History     Tobacco Use     Smoking status: Never Smoker     Smokeless tobacco: Never Used   Substance Use Topics     Alcohol use: Yes     Alcohol/week: 0.0 standard drinks     Comment: Occas       Living situation: house  Family support: yes   Vocational History:  Marketing   Recreational drug use: none         Functional history:     Seble Lopez is independent with all aspects of  life.    Assistive devices: none  Hand dominance: R  Driving: yes            Family History:     Family History   Problem Relation Age of Onset     Coronary Artery Disease Mother      Family  "History Negative Father             Medications:     No current outpatient medications on file.            Allergies:     Allergies   Allergen Reactions     Penicillin [Penicillins] Rash              ROS:        ROS: 10 point ROS neg other than the symptoms noted above in the HPI.             Physical Examiniation:     VITAL SIGNS: There were no vitals taken for this visit.  BMI: Estimated body mass index is 32.93 kg/m  as calculated from the following:    Height as of 8/30/21: 1.676 m (5' 6\").    Weight as of 8/30/21: 92.5 kg (204 lb).    EXAM:  Patient is interacting and in no acute distress.  Awake and alert.  No facial trauma or apparent cranial nerve deficit  No aphasia present  No deformities or rashes noted               Laboratory/Imaging:     COVID-19 PCR Results    COVID-19 PCR Results 9/16/20   COVID-19 Virus PCR to [facility] - Result Not Detected   COVID-19 Virus PCR to [facility] - Source Nasopharyngeal      Comments are available for some flowsheets but are not being displayed.         COVID-19 Antibody Results, Testing for Immunity    COVID-19 Antibody Results, Testing for Immunity   No data to display.             TSH   Date Value Ref Range Status   08/30/2021 0.76 0.40 - 4.00 mU/L Final   12/02/2019 2.15 0.40 - 4.00 mU/L Final              Assessment/Plan:     Seble was seen today for video visit.    Diagnoses and all orders for this visit:    Infection due to 2019 novel coronavirus  -     Adult Post Covid Clinic Referral    Other fatigue  -     Adult Post Covid Clinic Referral              1. Patient education: In depth discussion and education was provided about the assessment and implications of each of the below recommendations for management. Patient indicated readiness to learn, all questions were answered and understanding of material presented was confirmed.    2. Work-up:  None     3. Therapy/equipment/braces: start hme exercise program    4. Medications: no additions     5. Interventions: " discussed exercise and brain health and endurance     6. Referral / follow up with other providers:  PCP    7. Follow up: as needed     Alec Shafer,   Physical Medicine & Rehabilitation    I spent a total of 23 minutes face-to-face with Seble Lopez during today's office virtual visit. Over 50% of this time was spent counseling the patient and/or coordinating care. See note for details.     23 minutes spent on the date of the encounter doing chart review, history and exam, documentation and further activities as noted above                  Video-Visit Details    Type of service:  Video Visit    Start: 09/30/2021 01:15 pm  Stop: 09/30/2021 01:38 pm    Originating Location (pt. Location): [place]    Distant Location (provider location):  [facility] [place]     Platform used for Video Visit: [email or web address]  Answers for HPI/ROS submitted by the patient on 9/30/2021  If you checked off any problems, how difficult have these problems made it for you to do your work, take care of things at home, or get along with other people?: Somewhat difficult  PHQ9 TOTAL SCORE: 10  MANOLO 7 TOTAL SCORE: 4  General Symptoms: Yes  Skin Symptoms: No  HENT Symptoms: No  EYE SYMPTOMS: No  HEART SYMPTOMS: Yes  LUNG SYMPTOMS: Yes  INTESTINAL SYMPTOMS: Yes  URINARY SYMPTOMS: No  GYNECOLOGIC SYMPTOMS: No  BREAST SYMPTOMS: No  SKELETAL SYMPTOMS: No  BLOOD SYMPTOMS: No  NERVOUS SYSTEM SYMPTOMS: No  MENTAL HEALTH SYMPTOMS: Yes  Fever: No  Loss of appetite: No  Weight loss: No  Weight gain: Yes  Fatigue: Yes  Night sweats: Yes  Chills: Yes  Increased stress: Yes  Excessive hunger: Yes  Excessive thirst: No  Feeling hot or cold when others believe the temperature is normal: Yes  Loss of height: No  Post-operative complications: No  Surgical site pain: No  Hallucinations: No  Change in or Loss of Energy: Yes  Hyperactivity: No  Confusion: No  Chest pain or pressure: Yes  Fast or irregular heartbeat:  Yes  Pain in legs with walking: No  Trouble breathing while lying down: No  Fingers or toes appear blue: No  High blood pressure: No  Low blood pressure: No  Fainting: No  Murmurs: No  Pacemaker: No  Varicose veins: No  Edema or swelling: No  Wake up at night with shortness of breath: No  Light-headedness: No  Exercise intolerance: No  Cough: No  Sputum or phlegm: No  Coughing up blood: No  Difficulty breating or shortness of breath: No  Snoring: Yes  Wheezing: No  Difficulty breathing on exertion: Yes  Nighttime Cough: No  Difficulty breathing when lying flat: No  Heart burn or indigestion: Yes  Nausea: No  Vomiting: No  Abdominal pain: No  Bloating: Yes  Constipation: No  Diarrhea: Yes  Blood in stool: No  Black stools: No  Rectal or Anal pain: No  Fecal incontinence: No  Yellowing of skin or eyes: No  Vomit with blood: No  Change in stools: No  Nervous or Anxious: Yes  Depression: Yes  Trouble sleeping: Yes  Trouble thinking or concentrating: Yes  Mood changes: Yes  Panic attacks: No       [FreeTextEntry1] : I have independently reviewed the reports and the images.   B/l mammogram and US 3/22/24 - scattered fibroglandular density  - large calcified fat necrosis in R breast - BIRADS 2    B/l mammogram 1/29/25 - scattered fibroglandular density  - dense calcified fat necrosis in R lumpectomy site - BIRADS 2   Surgical pathology 2/12/25 - R mastectomy = 2.8 cm infiltrating ductal carcinoma, ER 90%, VA 0, Her2 0  Breast MRI 3/2/25 - post-surgical change in L breast; 6 mo MRI - no lymphadenopathy -  BIRADS 3

## 2025-03-20 NOTE — PHYSICAL EXAM
[NI] : Normal [de-identified] : Right chest wall flat  no signs of infection or palpable fluid collections noted incisions c/d/i breast skin flaps with normal capillary refill and warm surgical tape and glue intact minimal swelling firm medial area palpated near incision [de-identified] : bruising to right lower quadrant - improved

## 2025-03-20 NOTE — PHYSICAL EXAM
[NI] : Normal [de-identified] : Right chest wall flat  no signs of infection or palpable fluid collections noted incisions c/d/i breast skin flaps with normal capillary refill and warm surgical tape and glue intact minimal swelling firm medial area palpated near incision [de-identified] : bruising to right lower quadrant - improved

## 2025-03-20 NOTE — PHYSICAL EXAM
[NI] : Normal [de-identified] : Right chest wall flat  no signs of infection or palpable fluid collections noted incisions c/d/i breast skin flaps with normal capillary refill and warm surgical tape and glue intact minimal swelling firm medial area palpated near incision [de-identified] : bruising to right lower quadrant - improved

## 2025-03-20 NOTE — PHYSICAL EXAM
[NI] : Normal [de-identified] : Right chest wall flat  no signs of infection or palpable fluid collections noted incisions c/d/i breast skin flaps with normal capillary refill and warm surgical tape and glue intact minimal swelling firm medial area palpated near incision [de-identified] : bruising to right lower quadrant - improved

## 2025-03-20 NOTE — ASSESSMENT
[FreeTextEntry1] : Patient is doing well and healing as expected All surgical tape and glue removed today with gentle adhesive remover wipes. Extra wipes given to patient to use following a shower if they continue to feel tacky from residual glue Discussed scar massages twice daily for patient to perform on their own using their preferred lotion or scar cream. Massage all incisions twice daily using firm pressure in a circular motion with lotion or in the shower. Perform massage for 5 minutes in the morning and 5 minutes in the evening for at least 6-8 weeks. Advised patient that if they will be in direct sunlight to use SPF 30 or higher over all of the healing incisions to prevent color changes. monitor for redness, swelling, fever, chills Patient without restrictions at this time and may proceed with their normal daily activities Patient may wear whatever bra they choose  she is encouraged to call if there are any changes RTO in 2 months or sooner if needed all pt questions answered

## 2025-03-20 NOTE — HISTORY OF PRESENT ILLNESS
[FreeTextEntry1] : Ms. CHANTELLE FUENTES  is a 73 year  old female with past medical history of asthma, LBBB, hypothyroidism, aortic stenosis, T2DM, high cholesterol, and right breast infiltrating ductal carcinoma diagnosed in 1999 at age 47, who presents after being referred to the office by Dr Atkinson to discuss her revision options. Patient had a right lumpectomy, with axillary node dissection (1/24/00, Dr. Day) and right re-excision lumpectomy (2/9/00). In 2010, she had a revision surgery where they lifted and reduced her left breast for symmetry. She then underwent radiation therapy, followed by 7 years of tamoxifen and letrozole. She has a history of chronic seroma formation. She has developed fat necrosis adjacent to her lumpectomy scar. A calcified region of the fat necrosis eroded through her skin and is now visible. T  She is now s/p right mastectomy with oncoplastic flat closure 2/12 Doing ok She is now 5 weeks post op. She will be meeting with radiation oncology for possible continuation of treatment. Additionally, she had a right breast seroma aspiration to help with healing.

## 2025-03-24 NOTE — HISTORY OF PRESENT ILLNESS
[FreeTextEntry1] : The patient is a pleasant 73 year old female  diagnosed with recurrent right breast cancer referred by Dr. Atkinson.  She has a history of right breast infiltrating ductal carcinoma diagnosed in  at age 47, pathologic stage I, pT1 pN0, SBR , ER 80%, OR 90%, Her2 0, s/p R lumpectomy, axillary node dissection (00, Dr. Day) and R re-excision lumpectomy (2000) = no residual invasive tumor, DCIS, 0/15 LN.  She developed a seroma in the UOQ which was drained several times but never resolved. She was treated with right breast radiation therapy (6 weeks) in [place], [place] at [facility] and 7 yrs of tamoxifen followed by letrozole.  She reports severe moist desquamation with radiotherapy which caused her to be suicidal. She finally did heal. In , she had a revision surgery with left breast lift for symmetry.  She had a chronic seroma in the right UOQ breast which calcified and hardened into a firm mass with fat necrosis at her lumpectomy scar. Over the past 18 months, she has lost 40 pounds due to Mounjaro and b/l knee replacements.  A few months ago she noted calcified fat necrosis had eroded through the skin and opened her incision without drainage .  B/l mammogram 25 - scattered fibroglandular density - dense calcified fat necrosis in R lumpectomy site - BIRADS 2   25 Right mastectomy with oncoplastic flat closure, Dr. Atkinson and Cole. She was not a candidate for reconstruction due to her medical issues. - Pathology demonstrated Infiltrating ductal carcinoma, Grade 2, size 28 mm single focus, all margins negative for IDC and 2mm at Posterior margin. No LVI LN not applicable, ER 90%, OR 0, Her2 0  Her genetic panel testing is negative for any mutations and shows a VUS in the DICER1 gene. Her family history is not significant for any breast cancer.  A postop breast MRI 3/2/25 - post-surgical change in L breast; 6 mo MRI - no lymphadenopathy - BIRADS 3.  PET/ CT 3/13/25 showed 1. Right mastectomy, with mild diffuse heterogeneous activity throughout the right mastectomy site, likely related to post procedural changes, without focal hypermetabolism. There is a rim of mild activity surrounding amorphous fluid structure in the mid right breast, which may be related to seroma. 2. No abnormal focal FDG activity elsewhere on scan.  Oncotype DX resulted at . She has an appt 3/31/25 with Dr. King who has followed her for hematologic issues. 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 She has some right upper extremity mobility issues and has a PT evaluation scheduled this week. She reports mild pain. She has multiple medication allergies. She presents to discuss the role of radiation therapy in her care.

## 2025-03-24 NOTE — VITALS
[Maximal Pain Intensity: 3/10] : 3/10 [Least Pain Intensity: 0/10] : 0/10 [Pain Location: ___] : Pain Location: [unfilled] [Date: ____________] : Patient's last distress assessment performed on [unfilled]. [6 - Distress Level] : Distress Level: 6 [Referred Patient  to social work for follow-up] : Patient was referred to social work for follow-up [Patient given social work contact information and resource sheet] : Patient was given social work contact information and resource sheet [80: Normal activity with effort; some signs or symptoms of disease.] : 80: Normal activity with effort; some signs or symptoms of disease.

## 2025-03-24 NOTE — OB/GYN HISTORY
[History of Birth Control Pills] : Patient has a history of taking birth control pills [History of Hormone Replacement Therapy] : a history of hormone replacement therapy [Menopause Age: ____] : patient was [unfilled] years old at menopause [___] : Total Pregnancies: [unfilled]

## 2025-03-24 NOTE — PHYSICAL EXAM
[No UE Edema] : there is no upper extremity edema [Cervical Lymph Nodes Enlarged Posterior Bilaterally] : posterior cervical [Cervical Lymph Nodes Enlarged Anterior Bilaterally] : anterior cervical [Supraclavicular Lymph Nodes Enlarged Bilaterally] : supraclavicular [Axillary Lymph Nodes Enlarged Bilaterally] : axillary [No Focal Deficits] : no focal deficits [Normal] : oriented to person, place and time, the affect was normal, the mood was normal and not anxious [Oriented To Time, Place, And Person] : oriented to person, place, and time [de-identified] : R chest wall scar healed. Telangiectasias inferior to scar. L breast no masses [de-identified] : mild deficiit in R UE ROM

## 2025-03-24 NOTE — HISTORY OF PRESENT ILLNESS
[FreeTextEntry1] : The patient is a pleasant 73 year old female  diagnosed with recurrent right breast cancer referred by Dr. Atkinson.  She has a history of right breast infiltrating ductal carcinoma diagnosed in  at age 47, pathologic stage I, pT1 pN0, SBR , ER 80%, [place] 90%, Her2 0, s/p R lumpectomy, axillary node dissection (00, Dr. Day) and R re-excision lumpectomy (2000) = no residual invasive tumor, DCIS, 0/15 LN.  She developed a seroma in the UOQ which was drained several times but never resolved. She was treated with right breast radiation therapy (6 weeks) in [place], [place] at [facility] and 7 yrs of tamoxifen followed by letrozole.  She reports severe moist desquamation with radiotherapy which caused her to be suicidal. She finally did heal. In , she had a revision surgery with left breast lift for symmetry.  She had a chronic seroma in the right UOQ breast which calcified and hardened into a firm mass with fat necrosis at her lumpectomy scar. Over the past 18 months, she has lost 40 pounds due to Mounjaro and b/l knee replacements.  A few months ago she noted calcified fat necrosis had eroded through the skin and opened her incision without drainage .  B/l mammogram 25 - scattered fibroglandular density - dense calcified fat necrosis in R lumpectomy site - BIRADS 2   25 Right mastectomy with oncoplastic flat closure, Dr. Atkinson and Cole. She was not a candidate for reconstruction due to her medical issues. - Pathology demonstrated Infiltrating ductal carcinoma, Grade 2, size 28 mm single focus, all margins negative for IDC and 2mm at Posterior margin. No LVI LN not applicable, ER 90%, [place] 0, Her2 0  Her genetic panel testing is negative for any mutations and shows a VUS in the DICER1 gene. Her family history is not significant for any breast cancer.  A postop breast MRI 3/2/25 - post-surgical change in L breast; 6 mo MRI - no lymphadenopathy - BIRADS 3.  PET/ CT 3/13/25 showed 1. Right mastectomy, with mild diffuse heterogeneous activity throughout the right mastectomy site, likely related to post procedural changes, without focal hypermetabolism. There is a rim of mild activity surrounding amorphous fluid structure in the mid right breast, which may be related to seroma. 2. No abnormal focal FDG activity elsewhere on scan.  Oncotype DX resulted at . She has an appt 3/31/25 with Dr. King who has followed her for hematologic issues. 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 She has some right upper extremity mobility issues and has a PT evaluation scheduled this week. She reports mild pain. She has multiple medication allergies. She presents to discuss the role of radiation therapy in her care.

## 2025-03-24 NOTE — REVIEW OF SYSTEMS
[Constipation] : constipation [Joint Pain] : joint pain [Insomnia] : insomnia [Negative] : Allergic/Immunologic [FreeTextEntry9] : arthritis

## 2025-03-24 NOTE — PHYSICAL EXAM
[No UE Edema] : there is no upper extremity edema [Cervical Lymph Nodes Enlarged Posterior Bilaterally] : posterior cervical [Cervical Lymph Nodes Enlarged Anterior Bilaterally] : anterior cervical [Supraclavicular Lymph Nodes Enlarged Bilaterally] : supraclavicular [Axillary Lymph Nodes Enlarged Bilaterally] : axillary [No Focal Deficits] : no focal deficits [Normal] : oriented to person, place and time, the affect was normal, the mood was normal and not anxious [Oriented To Time, Place, And Person] : oriented to person, place, and time [de-identified] : R chest wall scar healed. Telangiectasias inferior to scar. L breast no masses [de-identified] : mild deficiit in R UE ROM

## 2025-05-15 NOTE — PHYSICAL EXAM
[NI] : Normal [de-identified] : Right chest wall flat  no signs of infection or palpable fluid collections noted incisions healed

## 2025-05-15 NOTE — HISTORY OF PRESENT ILLNESS
[FreeTextEntry1] : Ms. CHANTELLE FUENTES  is a 73 year  old female with past medical history of asthma, LBBB, hypothyroidism, aortic stenosis, T2DM, high cholesterol, and right breast infiltrating ductal carcinoma diagnosed in 1999 at age 47, who presents after being referred to the office by Dr Atkinsno to discuss her revision options. Patient had a right lumpectomy, with axillary node dissection (1/24/00, Dr. Day) and right re-excision lumpectomy (2/9/00). In 2010, she had a revision surgery where they lifted and reduced her left breast for symmetry. She then underwent radiation therapy, followed by 7 years of tamoxifen and letrozole. She has a history of chronic seroma formation. She has developed fat necrosis adjacent to her lumpectomy scar. A calcified region of the fat necrosis eroded through her skin and is now visible. T  She is now s/p right mastectomy with oncoplastic flat closure 2/12 Doing well, she is 3 months post op She has completed 2 sessions of chemotherapy, and will then undergo radiation therapy

## 2025-05-15 NOTE — ASSESSMENT
[FreeTextEntry1] : Patient is doing well and healing as expected mastectomy bra referral given to patient  Discussed scar massages twice daily for patient to perform on their own using their preferred lotion or scar cream. Massage all incisions twice daily using firm pressure in a circular motion with lotion or in the shower. Perform massage for 5 minutes in the morning and 5 minutes in the evening for at least 6-8 weeks. Advised patient that if they will be in direct sunlight to use SPF 30 or higher over all of the healing incisions to prevent color changes. monitor for redness, swelling, fever, chills Patient without restrictions at this time and may proceed with their normal daily activities she is encouraged to call if there are any changes RTO once radiation is completed all pt questions answered

## 2025-06-24 NOTE — PHYSICAL EXAM
[de-identified] : healed R chest wall scar with hyperpigmentation inferiorly. no skin nodules [de-identified] : mild impaired ROM of L arm

## 2025-06-24 NOTE — HISTORY OF PRESENT ILLNESS
[FreeTextEntry1] : The patient is a pleasant 73 year old female  diagnosed with recurrent right breast cancer referred by Dr. Atkinson.  She has a history of right breast infiltrating ductal carcinoma diagnosed in  at age 47, pathologic stage I, pT1 pN0, SBR , ER 80%, [place] 90%, Her2 0, s/p R lumpectomy, axillary node dissection (00, Dr. Day) and R re-excision lumpectomy (2000) = no residual invasive tumor, DCIS, 0/15 LN.  She developed a seroma in the UOQ which was drained several times but never resolved. She was treated with right breast radiation therapy (6 weeks) in [place], [place] at [facility] and 7 yrs of tamoxifen followed by letrozole.  She reports severe moist desquamation with radiotherapy which caused her to be suicidal. She finally did heal. In , she had a revision surgery with left breast lift for symmetry.  She had a chronic seroma in the right UOQ breast which calcified and hardened into a firm mass with fat necrosis at her lumpectomy scar. Over the past 18 months, she has lost 40 pounds due to Mounjaro and b/l knee replacements.  A few months ago she noted calcified fat necrosis had eroded through the skin and opened her incision without drainage .  B/l mammogram 25 - scattered fibroglandular density - dense calcified fat necrosis in R lumpectomy site - BIRADS 2   25 Right mastectomy with oncoplastic flat closure, Dr. Aktinson and Cole. She was not a candidate for reconstruction due to her medical issues. - Pathology demonstrated Infiltrating ductal carcinoma, Grade 2, size 28 mm single focus, all margins negative for IDC and 2mm at Posterior margin. No LVI LN not applicable, ER 90%, [place] 0, Her2 0  Her genetic panel testing is negative for any mutations and shows a VUS in the DICER1 gene. Her family history is not significant for any breast cancer.  A postop breast MRI 3/2/25 - post-surgical change in L breast; 6 mo MRI - no lymphadenopathy - BIRADS 3.  PET/ CT 3/13/25 showed 1. Right mastectomy, with mild diffuse heterogeneous activity throughout the right mastectomy site, likely related to post procedural changes, without focal hypermetabolism. There is a rim of mild activity surrounding amorphous fluid structure in the mid right breast, which may be related to seroma. 2. No abnormal focal FDG activity elsewhere on scan.  Oncotype DX resulted at . She has an appt 3/31/25 with Dr. King who has followed her for hematologic issues. 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 She has some right upper extremity mobility issues and has a PT evaluation scheduled this week. She reports mild pain. She has multiple medication allergies. She presents to discuss the role of radiation therapy in her care.   25 Patient presents for a f/u visit and for CT SIM. She completed 4 cycles of chemotherapy on 25 with Taxotere and Cytoxan, she received Neulasta x1. She has mild neuropathy and alopecia. She presents with her friend for simulation.

## 2025-07-08 NOTE — PHYSICAL EXAM
[Musculoskeletal - Swelling] : no joint swelling [Range of Motion to Joints] : range of motion to joints [Normal] : oriented to person, place and time, the affect was normal, the mood was normal and not anxious [Oriented To Time, Place, And Person] : oriented to person, place, and time [de-identified] : G0 R CW. healed R chest wall scar with hyperpigmentation inferiorly. no skin nodules [de-identified] : mild impaired ROM of L arm

## 2025-07-08 NOTE — HISTORY OF PRESENT ILLNESS
[FreeTextEntry1] : The patient is a pleasant 73 year old female  diagnosed with recurrent right breast cancer referred by Dr. Atkinson.  She has a history of right breast infiltrating ductal carcinoma diagnosed in  at age 47, pathologic stage I, pT1 pN0, SBR , ER 80%, TX 90%, Her2 0, s/p R lumpectomy, axillary node dissection (00, Dr. Day) and R re-excision lumpectomy (2000) = no residual invasive tumor, DCIS, 0/15 LN.  She developed a seroma in the UOQ which was drained several times but never resolved. She was treated with right breast radiation therapy (6 weeks) in [place], [place] at [facility] and 7 yrs of tamoxifen followed by letrozole.  She reports severe moist desquamation with radiotherapy which caused her to be suicidal. She finally did heal. In , she had a revision surgery with left breast lift for symmetry.  She had a chronic seroma in the right UOQ breast which calcified and hardened into a firm mass with fat necrosis at her lumpectomy scar. Over the past 18 months, she has lost 40 pounds due to Mounjaro and b/l knee replacements.  A few months ago she noted calcified fat necrosis had eroded through the skin and opened her incision without drainage .  B/l mammogram 25 - scattered fibroglandular density - dense calcified fat necrosis in R lumpectomy site - BIRADS 2   25 Right mastectomy with oncoplastic flat closure, Dr. Atkinson and Cole. She was not a candidate for reconstruction due to her medical issues. - Pathology demonstrated Infiltrating ductal carcinoma, Grade 2, size 28 mm single focus, all margins negative for IDC and 2mm at Posterior margin. No LVI LN not applicable, ER 90%, TX 0, Her2 0  Her genetic panel testing is negative for any mutations and shows a VUS in the DICER1 gene. Her family history is not significant for any breast cancer.  A postop breast MRI 3/2/25 - post-surgical change in L breast; 6 mo MRI - no lymphadenopathy - BIRADS 3.  PET/ CT 3/13/25 showed 1. Right mastectomy, with mild diffuse heterogeneous activity throughout the right mastectomy site, likely related to post procedural changes, without focal hypermetabolism. There is a rim of mild activity surrounding amorphous fluid structure in the mid right breast, which may be related to seroma. 2. No abnormal focal FDG activity elsewhere on scan.  Oncotype DX resulted at . She has an appt 3/31/25 with Dr. King who has followed her for hematologic issues. 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 She walks for exercise. She has some right upper extremity mobility issues and has a PT evaluation scheduled this week. She reports mild pain. She has multiple medication allergies. She presents to discuss the role of radiation therapy in her care.   25 Patient presents for a f/u visit and for CT SIM. She completed 4 cycles of chemotherapy on 25 with Taxotere and Cytoxan, she received Neulasta x1. She has mild neuropathy and alopecia. She presents with her friend for simulation.  25 Patient seen for OTV, fx . She is using miaderm. A bit claustrophobic but now more relaxed. G0 erythema R CW.

## 2025-07-08 NOTE — DISEASE MANAGEMENT
[Pathological] : TNM Stage: p [II] : II [TTNM] : 2 [NTNM] : x [MTNM] : 0 [de-identified] : 969 [[place] [id number]] : [id number] [de-identified] : right chest wall

## 2025-07-15 NOTE — PHYSICAL EXAM
[Musculoskeletal - Swelling] : no joint swelling [Range of Motion to Joints] : range of motion to joints [Normal] : oriented to person, place and time, the affect was normal, the mood was normal and not anxious [Oriented To Time, Place, And Person] : oriented to person, place, and time [de-identified] : G0 R CW. healed R chest wall scar with hyperpigmentation inferiorly. no skin nodules [de-identified] : mild impaired ROM of L arm

## 2025-07-15 NOTE — DISEASE MANAGEMENT
[Pathological] : TNM Stage: p [II] : II [TTNM] : 2 [NTNM] : x [MTNM] : 0 [de-identified] : [id number] [[place] [id number]] : [id number] [de-identified] : right chest wall

## 2025-07-15 NOTE — HISTORY OF PRESENT ILLNESS
[FreeTextEntry1] : The patient is a pleasant 73 year old female  diagnosed with recurrent right breast cancer referred by Dr. Atkinson.  She has a history of right breast infiltrating ductal carcinoma diagnosed in  at age 47, pathologic stage I, pT1 pN0, SBR , ER 80%, GA 90%, Her2 0, s/p R lumpectomy, axillary node dissection (00, Dr. Day) and R re-excision lumpectomy (2000) = no residual invasive tumor, DCIS, 0/15 LN.  She developed a seroma in the UOQ which was drained several times but never resolved. She was treated with right breast radiation therapy (6 weeks) in [place], [place] at [facility] and 7 yrs of tamoxifen followed by letrozole.  She reports severe moist desquamation with radiotherapy which caused her to be suicidal. She finally did heal. In , she had a revision surgery with left breast lift for symmetry.  She had a chronic seroma in the right UOQ breast which calcified and hardened into a firm mass with fat necrosis at her lumpectomy scar. Over the past 18 months, she has lost 40 pounds due to Mounjaro and b/l knee replacements.  A few months ago she noted calcified fat necrosis had eroded through the skin and opened her incision without drainage .  B/l mammogram 25 - scattered fibroglandular density - dense calcified fat necrosis in R lumpectomy site - BIRADS 2   25 Right mastectomy with oncoplastic flat closure, Dr. Atkinson and Cole. She was not a candidate for reconstruction due to her medical issues. - Pathology demonstrated Infiltrating ductal carcinoma, Grade 2, size 28 mm single focus, all margins negative for IDC and 2mm at Posterior margin. No LVI LN not applicable, ER 90%, GA 0, Her2 0  Her genetic panel testing is negative for any mutations and shows a VUS in the DICER1 gene. Her family history is not significant for any breast cancer.  A postop breast MRI 3/2/25 - post-surgical change in L breast; 6 mo MRI - no lymphadenopathy - BIRADS 3.  PET/ CT 3/13/25 showed 1. Right mastectomy, with mild diffuse heterogeneous activity throughout the right mastectomy site, likely related to post procedural changes, without focal hypermetabolism. There is a rim of mild activity surrounding amorphous fluid structure in the mid right breast, which may be related to seroma. 2. No abnormal focal FDG activity elsewhere on scan.  Oncotype DX resulted at . She has an appt 3/31/25 with Dr. King who has followed her for hematologic issues. 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 She has some right upper extremity mobility issues and has a PT evaluation scheduled this week. She reports mild pain. She has multiple medication allergies. She presents to discuss the role of radiation therapy in her care.   25 Patient presents for a f/u visit and for CT SIM. She completed 4 cycles of chemotherapy on 25 with Taxotere and Cytoxan, she received Neulasta x1. She has mild neuropathy and alopecia. She presents with her friend for simulation.  25 Patient seen for OTV, fx . She is using miaderm. A bit claustrophobic but now more relaxed. G0 erythema R CW.  7/15/25 Patient seen for OTV, fx . Feels well. Using miaderm. G0 erythema R chest wall.

## 2025-07-22 NOTE — HISTORY OF PRESENT ILLNESS
[FreeTextEntry1] : The patient is a pleasant 73 year old female  diagnosed with recurrent right breast cancer referred by Dr. Atkinson.  She has a history of right breast infiltrating ductal carcinoma diagnosed in  at age 47, pathologic stage I, pT1 pN0, SBR , ER 80%, SC 90%, Her2 0, s/p R lumpectomy, axillary node dissection (00, Dr. Day) and R re-excision lumpectomy (2000) = no residual invasive tumor, DCIS, 0/15 LN.  She developed a seroma in the UOQ which was drained several times but never resolved. She was treated with right breast radiation therapy (6 weeks) in [place], [place] at [facility] and 7 yrs of tamoxifen followed by letrozole.  She reports severe moist desquamation with radiotherapy which caused her to be suicidal. She finally did heal. In , she had a revision surgery with left breast lift for symmetry.  She had a chronic seroma in the right UOQ breast which calcified and hardened into a firm mass with fat necrosis at her lumpectomy scar. Over the past 18 months, she has lost 40 pounds due to Mounjaro and b/l knee replacements.  A few months ago she noted calcified fat necrosis had eroded through the skin and opened her incision without drainage .  B/l mammogram 25 - scattered fibroglandular density - dense calcified fat necrosis in R lumpectomy site - BIRADS 2   25 Right mastectomy with oncoplastic flat closure, Dr. Atkinson and Cole. She was not a candidate for reconstruction due to her medical issues. - Pathology demonstrated Infiltrating ductal carcinoma, Grade 2, size 28 mm single focus, all margins negative for IDC and 2mm at Posterior margin. No LVI LN not applicable, ER 90%, SC 0, Her2 0  Her genetic panel testing is negative for any mutations and shows a VUS in the DICER1 gene. Her family history is not significant for any breast cancer.  A postop breast MRI 3/2/25 - post-surgical change in L breast; 6 mo MRI - no lymphadenopathy - BIRADS 3.  PET/ CT 3/13/25 showed 1. Right mastectomy, with mild diffuse heterogeneous activity throughout the right mastectomy site, likely related to post procedural changes, without focal hypermetabolism. There is a rim of mild activity surrounding amorphous fluid structure in the mid right breast, which may be related to seroma. 2. No abnormal focal FDG activity elsewhere on scan.  Oncotype DX resulted at . She has an appt 3/31/25 with Dr. King who has followed her for hematologic issues. 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 She has some right upper extremity mobility issues and has a PT evaluation scheduled this week. She reports mild pain. She has multiple medication allergies. She presents to discuss the role of radiation therapy in her care.   25 Patient presents for a f/u visit and for CT SIM. She completed 4 cycles of chemotherapy on 25 with Taxotere and Cytoxan, she received Neulasta x1. She has mild neuropathy and alopecia. She presents with her friend for simulation.  25 Patient seen for OTV, fx . She is using miaderm. A bit claustrophobic but now more relaxed. G0 erythema R CW.  7/15/25 Patient seen for OTV, fx . Feels well. Using miaderm. G0 erythema R chest wall.  25 seen for OTV. fx . Feels well. Mild hyperpigmentation R chest wall. Using miaderm. Trying reflexology today.

## 2025-07-22 NOTE — DATA REVIEWED
[FreeTextEntry1] : I have independently reviewed the reports and the images.   B/l mammogram 1/29/25 - scattered fibroglandular density  - dense calcified fat necrosis in R lumpectomy site - BIRADS 2   Surgical pathology 2/12/25 - R mastectomy = 2.8 cm infiltrating ductal carcinoma, ER 90%, HI 0, Her2 0  Breast MRI 3/2/25 - post-surgical change in R mastectomy bed; 6 mo MRI - no lymphadenopathy -  BIRADS 3   PET scan 3/13/25 - no distant metastasis

## 2025-07-22 NOTE — PHYSICAL EXAM
[Normocephalic] : normocephalic [Atraumatic] : atraumatic [Sclera nonicteric] : sclera nonicteric [Supple] : supple [No Supraclavicular Adenopathy] : no supraclavicular adenopathy [No Cervical Adenopathy] : no cervical adenopathy [Clear to Auscultation Bilat] : clear to auscultation bilaterally [Normal Sinus Rhythm] : normal sinus rhythm [Examined in the supine and seated position] : examined in the supine and seated position [Asymmetrical] : asymmetrical [No dominant masses] : no dominant masses in right breast  [No dominant masses] : no dominant masses left breast [No Nipple Retraction] : no left nipple retraction [No Nipple Discharge] : no left nipple discharge [No Axillary Lymphadenopathy] : no left axillary lymphadenopathy [No Edema] : no edema [No Rashes] : no rashes [No Ulceration] : no ulceration [Murmurs] : no murmurs [de-identified] : R << L [de-identified] : Transverse scar

## 2025-07-22 NOTE — PHYSICAL EXAM
[Musculoskeletal - Swelling] : no joint swelling [Range of Motion to Joints] : range of motion to joints [Normal] : oriented to person, place and time, the affect was normal, the mood was normal and not anxious [Oriented To Time, Place, And Person] : oriented to person, place, and time [de-identified] : G1 R CW. healed R chest wall scar with hyperpigmentation inferiorly. no skin nodules [de-identified] : mild impaired ROM of L arm

## 2025-07-22 NOTE — DISEASE MANAGEMENT
[Pathological] : TNM Stage: p [II] : II [TTNM] : 2 [NTNM] : x [MTNM] : 0 [de-identified] : [id number] [[place] [id number]] : [id number] [de-identified] : right chest wall

## 2025-07-22 NOTE — PAST MEDICAL HISTORY
[Postmenopausal] : The patient is postmenopausal [Menarche Age ____] : age at menarche was [unfilled] [Menopause Age____] : age at menopause was [unfilled] [History of Hormone Replacement Treatment] : has a history of hormone replacement treatment [Total Preg ___] : G[unfilled] [de-identified] : systemic estradiol x 7 years, last taken around 1999

## 2025-07-22 NOTE — HISTORY OF PRESENT ILLNESS
[FreeTextEntry1] : Ms. Mayorga is a 73 year old woman who presents for a follow-up for surveillance for breast cancer. Her breast history is significant for  1.) Right breast infiltrating ductal carcinoma diagnosed in 1999 at age 47, pathologic stage I, pT1 pN0, SBR 3 to 4/9, ER 80%, CO 90%, Her2 0 - s/p R lumpectomy, axillary node dissection (1/24/00, Dr. Day) and R re-excision lumpectomy (2/9/00) = no residual invasive tumor, DCIS, 0/15 LN - s/p radiation - s/p 7 yrs of tamoxifen then letrozole  2.) Local recurrence of right infiltrating ductal carcinoma, occult on imaging and diagnosed on surgical pathology, ER 90%, CO 0, Her2 0 - s/p R mastectomy for painful calcified mass (2/12/25) with flat closure (Dr. Dey) = 2.8 cm infiltrating ductal carcinoma - Oncotype = 31; s/p chemotherapy (Dr. King, [facility])  She is currently undergoing post-mastectomy radiation (Dr. Watts) and sees the breast physical therapist for tightness by the right shoulder and mastectomy site.  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 Her family history is not significant for any breast cancer.

## 2025-07-22 NOTE — ASSESSMENT
[FreeTextEntry1] : Ms. Mayorga is a 73 year old woman  - 25 years s/p R lumpectomy, axillary node dissection for a stage right infiltrating ductal carcinoma  - 6 mo s/p R mastectomy that showed a local recurrence of right infiltrating ductal carcinoma   Her breast exam today is without suspicious findings. She already has a script for post-mastectomy bras, and a script for the breast prosthesis is provided. A Knitted Knocker is also provided for the time being. I would like to see her for a follow up in 4 months. The 6 month follow-up breast MRI for post-surgical enhancement in the right mastectomy bed will be deferred to around December to allow time for the recent radiation changes in the right mastectomy bed to subside. She understands and is comfortable with the plan. She is encouraged to call if any questions or concerns arise.